# Patient Record
Sex: FEMALE | Race: BLACK OR AFRICAN AMERICAN | NOT HISPANIC OR LATINO | Employment: PART TIME | URBAN - METROPOLITAN AREA
[De-identification: names, ages, dates, MRNs, and addresses within clinical notes are randomized per-mention and may not be internally consistent; named-entity substitution may affect disease eponyms.]

---
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
## 2022-02-17 ENCOUNTER — HISTORICAL (OUTPATIENT)
Dept: ADMINISTRATIVE | Facility: HOSPITAL | Age: 22
End: 2022-02-17

## 2022-04-27 ENCOUNTER — HISTORICAL (OUTPATIENT)
Dept: ADMINISTRATIVE | Facility: HOSPITAL | Age: 22
End: 2022-04-27
Payer: MEDICAID

## 2022-04-30 NOTE — ED PROVIDER NOTES
Patient:   Saloni Newell             MRN: [id number]            FIN: [id number]               Age:   21 years     Sex:  Female     :  2000   Associated Diagnoses:   Acute tonsillitis   Author:   Arron Jones      Basic Information   Time seen: Date & time 3/26/2021 20:18:00.   History source: Patient.   Arrival mode: Private vehicle.   Additional information: Patient's physician(s): [id number]: Assumed care CARLOS ALANIS, Chief Complaint from Nursing Triage Note : Chief Complaint   3/26/2021 20:05 CDT      Chief Complaint           Seen in ED 3/20, Dx w/ strep. Today, C/O throat pain, fever, weakness, vomiting, and tachycardia.  .      History of Present Illness   The patient presents with   21 year old female presents to ED for palpitations, throat pain, vomiting; Patient was seen in ED on Saturday for similar symptoms - BERTRAND Lucia .  The onset was 1  weeks ago.  The course/duration of symptoms is fluctuating in intensity.  Character of symptoms fast.  The degree at onset was moderate.  The degree at present is moderate.  The exacerbating factor is none.  The relieving factor is none.  Risk factors consist of none.  Prior episodes: none.  Therapy today: see nurses notes.  Associated symptoms: nausea, vomiting and fever, sore throat .        Review of Systems   Constitutional symptoms:  Fever, weakness, fatigue, No chills,    ENMT symptoms:  Sore throat.   Respiratory symptoms:  No shortness of breath, no cough.    Cardiovascular symptoms:  Palpitations, No chest pain,    Gastrointestinal symptoms:  Nausea, vomiting, No diarrhea,    Genitourinary symptoms:  No dysuria,    Musculoskeletal symptoms:  No back pain,    Neurologic symptoms:  No altered level of consciousness, no weakness.    Endocrine symptoms:  No polyuria, no polydipsia, no polyphagia.              Additional review of systems information: All other systems reviewed and otherwise negative.      Health Status    Allergies:    Allergic Reactions (Selected)  No Known Allergies,    Allergies (1) Active Reaction  No Known Allergies None Documented  .   Medications:  (Selected)   Documented Medications  Documented  LORATADINE   TAB 10MG: 10 mg = 1 tab(s), Daily  SMZ/TMP DS   -160: 1 tab(s), BID, per nurse's notes.   Immunizations: Per nurse's notes.   Menstrual history: Per nurse's notes.      Past Medical/ Family/ Social History   Medical history: Negative.    Medical history:    No active or resolved past medical history items have been selected or recorded., Reviewed as documented in chart.        Medical history:    No active or resolved past medical history items have been selected or recorded..       Surgical history: Negative.    Surgical history:    No active procedure history items have been selected or recorded., Reviewed as documented in chart.        Surgical history:    No active procedure history items have been selected or recorded..       Family history: Not significant.    Family history:    No family history items have been selected or recorded., Reviewed as documented in chart.        Family history:    No family history items have been selected or recorded..       Social history:    Social & Psychosocial Habits    Tobacco  03/20/2021  Use: Never (less than 100 in l    Patient Wants Consult For Cessation Counseling No    03/26/2021  Use: Never (less than 100 in l    Patient Wants Consult For Cessation Counseling N/A    Abuse/Neglect  03/20/2021  SHX Any signs of abuse or neglect No    03/26/2021  SHX Any signs of abuse or neglect No  , Reviewed as documented in chart, Alcohol use: Denies, Tobacco use: Denies, Drug use: Denies.   Problem list:    Active Problems (1)  No Chronic Problems   , per nurse's notes.      Physical Examination               Vital Signs   Vital Signs   3/26/2021 20:05 CDT      Temperature Oral          38.0 DegC                             Temperature Oral (calculated)              100.40 DegF                             Peripheral Pulse Rate     122 bpm  HI                             Respiratory Rate          16 br/min                             SpO2                      96 %                             Oxygen Therapy            Room air                             Systolic Blood Pressure   119 mmHg                             Diastolic Blood Pressure  83 mmHg  .      Vital Signs (last 24 hrs)_____  Last Charted___________  Temp Oral     38.0 DegC  (MAR 26 20:05)  Heart Rate Peripheral   H 122bpm  (MAR 26 20:05)  Resp Rate         16 br/min  (MAR 26 20:05)  SBP      119 mmHg  (MAR 26 20:05)  DBP      83 mmHg  (MAR 26 20:05)  SpO2      96 %  (MAR 26 20:05)  .   Measurements   3/26/2021 20:05 CDT      Weight Dosing             133 kg                             Weight Measured and Calculated in Lbs     293.21 lb                             Weight Estimated          133 kg                             Height/Length Dosing      175.26 cm                             Height/Length Estimated   175.26 cm                             Body Mass Index Estimated 43.3 kg/m2  .   Basic Oxygen Information   3/26/2021 20:05 CDT      SpO2                      96 %                             Oxygen Therapy            Room air  .   General:  Alert, mild distress, well hydrated no acute distress , Skin: Normal for ethnicity.    Skin:  Warm, dry, pink, intact.    Head:  Normocephalic.   Neck:  Supple, no tenderness, full range of motion.    Eye:  Pupils are equal, round and reactive to light, extraocular movements are intact, normal conjunctiva.    Ears, nose, mouth and throat:  Oral mucosa moist, Throat: Bilateral, moderate, tonsil, erythema, with exudate, swelling.    Cardiovascular:  Regular rate and rhythm, No murmur, Normal peripheral perfusion.    Respiratory:  Lungs are clear to auscultation, breath sounds are equal, Respirations: not tachypneic, not labored, not shallow, Retractions: None.    Chest  wall:  No tenderness.   Back:  Normal range of motion, Normal alignment, No costovertebral angle tenderness,    Musculoskeletal:  Normal ROM, normal strength, no swelling, no deformity.    Gastrointestinal:  Soft, Nontender, Non distended, Normal bowel sounds.    Neurological:  Alert and oriented to person, place, time, and situation, No focal neurological deficit observed, normal sensory observed, normal motor observed, normal speech observed, normal coordination observed, Gait: Normal.    Psychiatric:  Cooperative, appropriate mood & affect, normal judgment.       Medical Decision Making   Documents reviewed:  Emergency department nurses' notes.   Orders  Launch Orders   Laboratory:  POC iSTAT ER Troponin request (Order): Blood, Stat collect, 3/26/2021 20:18 CDT by Lance Cadena PA-C, Lab Collect, Print Label By Order Location  Troponin-I (Order): Stat collect, 3/26/2021 20:18 CDT, Blood, Lab Collect, Print Label By Order Location, 3/26/2021 20:18 CDT  Free T4 (Order): Stat collect, 3/26/2021 20:18 CDT, Blood, Lab Collect, Print Label By Order Location, 3/26/2021 20:18 CDT  TSH (Order): Stat collect, 3/26/2021 20:18 CDT, Blood, Lab Collect, Print Label By Order Location, 3/26/2021 20:18 CDT  Urinalysis with Reflex (Order): Stat collect, Urine, 3/26/2021 20:18 CDT, Nurse collect, Print Label By Order Location  POC Urine Pregnancy Test ED (Order): Urine, Stat collect, Collected, 3/26/2021 20:18 CDT by Lance Cadena PA-C Nurse collect, Print Label By Order Location  CBC w/ Auto Diff (Order): Stat collect, 3/26/2021 20:18 CDT, Blood, Lab Collect, Print Label By Order Location, 3/26/2021 20:18 CDT  CMP (Order): Stat collect, 3/26/2021 20:18 CDT, Blood, Lab Collect, Print Label By Order Location, 3/26/2021 20:18 CDT  Radiology:  XR Chest 1 View (Order): Stat, 3/26/2021 20:18 CDT, Cough, None, Stretcher, Rad Type, Not Scheduled, Launch Orders   Pharmacy:  Normal Saline (0.9% NS) IV 1,000 mL (Order): 1,000 mL, 1,000  mL, IV, Once, 1,000 mL/hr, start date 3/26/2021 20:24 CDT, 2.43, m2, Launch Orders   Laboratory:  Mononucleosis Screen (Order): Stat collect, 3/26/2021 23:50 CDT, Blood, Lab Collect, Print Label By Order Location, 3/26/2021 23:50 CDT  ED POC StrepA PCR Request: (Order): Throat, Routine collect, 3/26/2021 23:50 CDT by Earnestine SMALL, Arron Medley, Nurse collect  FLU A&B/COVID PCR Combo (Order): Stat collect, Nasopharyngeal Swab, 3/26/2021 23:49 CDT, Nurse collect  , Launch Orders   Pharmacy:  Lidocaine Viscous 2% mucous membrane solution (Order): 5 mL, form: Soln, TOP, Once, first dose 3/27/2021 0:03 CDT, stop date 3/27/2021 0:03 CDT, gargle and spit  .    Electrocardiogram:  Time 3/26/2021 20:11:00, rate 118, No ST-T changes, no ectopy, normal AR & QRS intervals, EP Interp, The Rhythm is sinus tachycardia.  .    Results review:  Lab results : Lab View   3/26/2021 23:52 CDT      Strep A PCR               NOT DETECTED    3/26/2021 23:50 CDT      Influ A PCR               Negative                             Influ B PCR               Negative                             SARS-CoV-2 PCR            Not Detected    3/26/2021 21:29 CDT      Est Creat Clearance Ser   86.76 mL/min    3/26/2021 20:50 CDT      POC Troponin              0.00 ng/mL    3/26/2021 20:45 CDT      Sodium Lvl                135 mmol/L  LOW                             Potassium Lvl             3.9 mmol/L                             Chloride                  98 mmol/L                             CO2                       23 mmol/L                             Calcium Lvl               10.0 mg/dL                             Glucose Lvl               107 mg/dL  HI                             BUN                       9.7 mg/dL                             Creatinine                1.09 mg/dL  HI                             eGFR-AA                   >60  NA                             eGFR-FARRUKH                  >60 mL/min/1.73 m2  NA                              Bili Total                0.5 mg/dL                             Bili Direct               0.2 mg/dL                             Bili Indirect             0.30 mg/dL                             AST                       29 unit/L                             ALT                       31 unit/L                             Alk Phos                  100 unit/L                             Total Protein             8.9 gm/dL  HI                             Albumin Lvl               3.7 gm/dL                             Globulin                  5.2 gm/dL  HI                             A/G Ratio                 0.7 ratio  LOW                             Troponin-I                <0.010 ng/mL                             T4 Free                   0.94 ng/dL                             TSH                       4.9599 uIU/mL  HI                             WBC                       17.8 x10(3)/mcL  HI                             RBC                       4.83 x10(6)/mcL                             Hgb                       13.1 gm/dL                             Hct                       38.7 %                             Platelet                  409 x10(3)/mcL  HI                             MCV                       80.1 fL                             MCH                       27.1 pg                             MCHC                      33.9 gm/dL                             RDW                       13.7 %                             MPV                       10.4 fL                             Abs Neut                  12.39 x10(3)/mcL  HI                             Neutro Auto               70 %  NA                             Lymph Auto                16 %  NA                             Mono Auto                 13 %  NA                             Eos Auto                  0 %  NA                             Abs Eos                   0.1 x10(3)/mcL                             Basophil Auto             0 %  NA                              Abs Neutro                12.39 x10(3)/mcL  HI                             Abs Lymph                 2.8 x10(3)/mcL                             Abs Mono                  2.3 x10(3)/mcL  HI                             Abs Baso                  0.1 x10(3)/mcL    ,    No qualifying data available.    Chest X-Ray:  Time reported 3/26/2021 23:45:00, no acute disease process, interpretation by Emergency Physician.       Reexamination/ Reevaluation   Vital signs   Basic Oxygen Information   3/26/2021 20:05 CDT      SpO2                      96 %                             Oxygen Therapy            Room air        Impression and Plan   Diagnosis   Acute tonsillitis ([id number] J03.90)      Calls-Consults   -  Dr Robles will speak to ENT for admission .   Plan   Condition: Improved, Stable.    Disposition: Admit time  3/27/2021 00:59:00, Admit to Inpatient Unit.    Counseled: Patient, Regarding diagnosis, Regarding diagnostic results, Regarding treatment plan, Patient indicated understanding of instructions.    Notes: Admitted in collaboration with Dr. Judd .

## 2022-04-30 NOTE — ED PROVIDER NOTES
Patient:   Saloni Newell             MRN: [id number]            FIN: [id number]               Age:   21 years     Sex:  Female     :  2000   Associated Diagnoses:   None   Author:   Binta SINGLETON MD, Marty VERDE      Addendum      Teaching-Supervisory Addendum-Brief   I participated in the following activities of this patients care: the medical history, the physical exam, medical decision making.   I personally performed: the medical history, the physical exam, the medical decision making.   The case was discussed with: the nurse practitioner.   Evaluation and management service: I agree with the evaluation and management decisions made in this patient's care.   Time seen: 3/27/2021 .   Results interpretation: I agree with the study interpretation in this patient's care, Nurse's notes reviewed.   Vital signs: Basic Oxygen Information   3/27/2021 0:18 CDT       SpO2                      97 %    3/26/2021 20:05 CDT      SpO2                      96 %                             Oxygen Therapy            Room air  .   Notes: Patient seen and evaluated by the nurse practitioner I've also seen and evaluated and perform a own independent history and physical, this is a 21-year-old female with no significant medical history who is had a one-week history of sore throat she presented to this emergency room on the  a CT of the soft tissue neck showed enlarged tonsils with adenopathy no abscess but cannot rule out microabscess formation.  Patient was given Decadron patient has been to the ER and primary care doctor for times a week is gotten steroids prednisone Norco antibiotics without any significant relief patient unable swallow soup on exam it is a pleasant overweight female 3+ exudative tonsils.  Erythematous no uvular midline shift posterior oropharynx appears open patient with a mild leukocytosis mild tachycardia febrile discussed case with Dr. Dany Valdivia will bring in for evaluation.

## 2022-05-04 NOTE — HISTORICAL [facility] CERNER
This is a historical note converted from Rony. Formatting and pictures may have been removed.  Please reference Rony for original formatting and attached multimedia. Admit and Discharge Dates  Admit Date: 03/26/2021  Discharge Date: 03/28/2021  Physicians  Attending Physician - Dany LO, Franki MADDEN  Admitting Physician - Dany LO, Franki MADDEN  Primary Care Physician - Colt LO , Henrique  Discharge Diagnosis  Acute tonsillitis?J03.90  Palpitations?[id number]  Surgical Procedures  No procedures recorded for this visit.  Immunizations  No immunizations recorded for this visit.  Admission Information  21 year old female with acute tonsillitis with failed outpatient treatment and dehydration. Admitted for IV antibiotics, steroids and IV fluids.  Significant Findings  She had tremendous improvement after 36 hours of treatment. PO?intake was significantly improved.?Discharged home on PO antibiotics.  Time Spent on discharge  20 minutes  Objective  Physical Exam  NAD  3+ tonsils  Shotty LAD  Unlabored breathing  Patient Discharge Condition  Good  Discharge Disposition  Home   Discharge Medication Reconciliation  Prescribed  cefdinir (cefdinir 300 mg oral capsule)?300 mg, Oral, q12hr  Continue  loratadine (LORATADINE ? TAB 10MG)?10 mg, Daily  Discontinue  sulfamethoxazole-trimethoprim (SMZ/TMP DS ? -160)?1 tab(s), BID  Education and Orders Provided  ENT--Tonsillectomy, Adult (CUSTOM)  Tonsillitis  Antibiotic Medicine, Adult  Discharge - 03/28/21 8:12:00 CDT, Home, Give all scheduled vaccinations prior to discharge.?  Discharge Activity - Activity as Tolerated?  Discharge Diet - Regular?  Follow up  More Gregory MD  ????PCP in 1-2 weeksDue to your afterhours discharge, we were unable to schedule your appointment with your physician. Someone from [address] [place] [address] will call you on the next business day with your appointment information.  [employer]  No Qualifying Data

## 2022-05-04 NOTE — HISTORICAL [facility] CERNER
This is a historical note converted from [employer]. Formatting and pictures may have been removed.  Please reference [employer] for original formatting and attached multimedia. Chief Complaint  Seen in ED 3/20, Dx w/ strep. Today, C/O throat pain, fever, weakness, vomiting, and tachycardia.  History of Present Illness  21 year old female who presents today with sore throat and poor PO intake. Symptoms began over a week ago. She presented to the ER one week ago with similar symptoms. She was treated with Decadron and discharged with Norco and oral antibiotics. Symptoms have worsened and she is unable to tolerate even liquids. Complains of lightheadedness and heart palpitations. + fever.  Review of Systems  Constitutional - Denies other than HPI  Eye - Denies  HENT - Denies other than HPI  Respiratory - Denies  Cardiovascular - Denies  Gastrointestinal - Denies  Genitourinary - Denies  Heme/Lymph ?- Denies  Endocrine ?- Denies  Immunologic ?- Denies  Musculoskeletal ?- Denies  Integumentary ?- Denies  Neurologic ?- Denies  All Other ROS negative  Physical Exam  Vitals & Measurements  T:?36.8? ?C (Oral)? TMIN:?36.8? ?C (Oral)? TMAX:?38.0? ?C (Oral)? HR:?79(Peripheral)? RR:?16? BP:?100/66? SpO2:?97%? WT:?133?kg? BMI:?43.43?  General - mild distress, alert/oriented, hot potato voice  Eye - extraocular movements intact bilaterally, pupils equal round and reactive to light and accommodation  Head - normocephalic, atraumatic  Ears - externally normal. Pneumatized bilaterally.  Nose - bilateral nares patent, nasal septal midline, no masses/lesions apparent  Oral cavity/oropharynx - mucosal membranes moist, 4+ exudative tonsils, floor of mouth soft and nontender, tongue within normal limits, no masses or lesions noted  Neck - supple, no cervical lymphadenopathy, normal range of motion  Respiratory - nonlabored breathing  Neurologic - cranial nerves II to XII grossly intact bilaterally  Assessment/Plan  21 year old female with acute  tonsillitis, failed outpatient treatment, dehydration  ?  Admit for observation  Start IV Rocephin and Decadron  IV fluids   Problem List/Past Medical History  Ongoing  Morbid obesity  Historical  No qualifying data  Medications  Inpatient  acetaminophen, 1000 mg= 2 tab(s), Oral, q6hr, PRN  Decadron, 4 mg= 1 mL, IV Push, BID  Magic Mouthwash, 5 mL, Oral, QID, PRN  morphine 4 mg/mL preservative-free intravenous solution, 4 mg= 1 mL, IV Push, q4hr, PRN  Rocephin (for IVPB)  Sodium Chloride 0.9% intravenous solution 1,000 mL, 1000 mL, IV  Toradol, 30 mg= 1 mL, IV Push, q6hr, PRN  Zofran, 4 mg= 2 mL, IV Push, q4hr, PRN  Home  LORATADINE TAB 10MG, 10 mg= 1 tab(s), Daily  SMZ/TMP DS -160, 1 tab(s), BID  Allergies  No Known Allergies  Social History  Abuse/Neglect  No, 03/26/2021  No, 03/20/2021  Tobacco  Never (less than 100 in lifetime), N/A, 03/26/2021  Never (less than 100 in lifetime), No, 03/20/2021  Immunizations  Vaccine Date Status   tetanus toxoid 09/10/2013 Given   Diagnostic Results  (03/20/2021 11:53 CDT CT Soft Tissue Neck W Contrast)  Radiology Report  CT Soft Tissue Neck W Contrast  ?  REASON FOR STUDY: Throat infection, difficulty swallowing  ?  TECHNIQUE: CT imaging was performed of the neck with IV contrast.  Automatic exposure control, adjustment of mA/kV or iterative  reconstruction technique was used to limit radiation dose.  ?  COMPARISON: No relevant comparison studies available at the time of  dictation.?  ?  FINDINGS:?  ?  There is increased soft tissue prominence of Waldeyers ring  consistent with lymphadenopathy. The bilateral lingual tonsils are  enlarged and heterogeneous without a well-defined or measurable fluid  collection. The airway is patent. The parapharyngeal soft tissues are  otherwise unremarkable.  ?  The bilateral parotid and submandibular glands are unremarkable. There  are scattered prominent lymph nodes throughout the bilateral cervical  chains. These may be reactive. The  largest is located on the left deep  to the sternocleidomastoid muscle measuring 9 mm on short axis (series  3, image #29). The soft tissues of the neck are otherwise  unremarkable.  ?  The bilateral orbits are unremarkable. The visualized intracranial  structures are unremarkable. The thyroid gland is unremarkable. The  visualized upper mediastinum is unremarkable. The visualized lung  apices are unremarkable. The osseous structures are unremarkable.  ?  ?  IMPRESSION:  ?  1. Lymphadenopathy involving Waldeyers ring with heterogeneity of the  bilateral lingual tonsils. There is no well-defined or focal fluid  collection to be measured to suggest drainable abscess. The  heterogeneity is nonspecific with microabscess formation a  possibility.  ?  2. Mildly prominent cervical chain lymph nodes are most likely  reactive. [1]     [1]?CT Soft Tissue Neck W Contrast; Meera LO, Ben ARMENDARIZ 03/20/2021 11:53 CDT

## 2024-02-29 ENCOUNTER — HOSPITAL ENCOUNTER (EMERGENCY)
Facility: HOSPITAL | Age: 24
Discharge: HOME | End: 2024-02-29
Attending: EMERGENCY MEDICINE
Payer: COMMERCIAL

## 2024-02-29 VITALS
HEIGHT: 69 IN | WEIGHT: 293 LBS | OXYGEN SATURATION: 100 % | DIASTOLIC BLOOD PRESSURE: 85 MMHG | SYSTOLIC BLOOD PRESSURE: 130 MMHG | TEMPERATURE: 98 F | BODY MASS INDEX: 43.4 KG/M2 | RESPIRATION RATE: 18 BRPM | HEART RATE: 72 BPM

## 2024-02-29 DIAGNOSIS — R07.89 CHEST WALL PAIN: Primary | ICD-10-CM

## 2024-02-29 DIAGNOSIS — R07.9 CHEST PAIN: ICD-10-CM

## 2024-02-29 PROCEDURE — 99283 EMERGENCY DEPT VISIT LOW MDM: CPT | Mod: 25

## 2024-02-29 RX ORDER — DICLOFENAC SODIUM 50 MG/1
50 TABLET, DELAYED RELEASE ORAL 3 TIMES DAILY PRN
Qty: 15 TABLET | Refills: 0 | Status: SHIPPED | OUTPATIENT
Start: 2024-02-29 | End: 2024-03-05

## 2024-03-01 NOTE — FIRST PROVIDER EVALUATION
"Medical screening examination initiated.  I have conducted a focused provider triage encounter, findings are as follows:    Brief history of present illness:  arrived to ED due to intermittent CP for several months. Reports started on L side and now on R side. Also c/o SOB. States reading helps with symptoms.     Vitals:    02/29/24 1947   BP: 134/84   Pulse: 79   Resp: 20   Temp: 97.5 °F (36.4 °C)   SpO2: 99%   Weight: (!) 140.2 kg (309 lb)   Height: 5' 9" (1.753 m)       Pertinent physical exam:  awake, alert, has non-labored breathing, and follows commands.     Brief workup plan:  imaging     Preliminary workup initiated; this workup will be continued and followed by the physician or advanced practice provider that is assigned to the patient when roomed.  "

## 2024-03-01 NOTE — ED PROVIDER NOTES
Encounter Date: 2/29/2024       History     Chief Complaint   Patient presents with    Chest Pain     Intermittent CP on left side radiating to right side x 5 months, woke up with CP today , pain has been constant with sob. Pt denies fever, n/v/cough, congestion. Reading helps relieve the pain     See MDM    The history is provided by the patient. No  was used.     Review of patient's allergies indicates:  No Known Allergies  Past Medical History:   Diagnosis Date    Known health problems: none      Past Surgical History:   Procedure Laterality Date    none       History reviewed. No pertinent family history.  Social History     Tobacco Use    Smoking status: Never    Smokeless tobacco: Never   Substance Use Topics    Alcohol use: Not Currently    Drug use: Never     Review of Systems   Constitutional:  Negative for fever.   Respiratory:  Negative for cough and shortness of breath.    Cardiovascular:  Positive for chest pain.   Gastrointestinal:  Negative for abdominal pain.   Genitourinary:  Negative for difficulty urinating and dysuria.   Musculoskeletal:  Negative for gait problem.   Skin:  Negative for color change.   Neurological:  Negative for dizziness, speech difficulty and headaches.   Psychiatric/Behavioral:  Negative for hallucinations and suicidal ideas.    All other systems reviewed and are negative.      Physical Exam     Initial Vitals [02/29/24 1947]   BP Pulse Resp Temp SpO2   134/84 79 20 97.5 °F (36.4 °C) 99 %      MAP       --         Physical Exam    Nursing note and vitals reviewed.  Constitutional: She appears well-developed and well-nourished.   HENT:   Head: Normocephalic.   Eyes: EOM are normal.   Neck:   Normal range of motion.  Cardiovascular:  Normal rate, regular rhythm, normal heart sounds and intact distal pulses.           Pulmonary/Chest: Breath sounds normal. No respiratory distress. She exhibits tenderness.   Abdominal: Abdomen is soft. Bowel sounds are normal.  There is no abdominal tenderness.   Musculoskeletal:         General: Normal range of motion.      Cervical back: Normal range of motion.     Neurological: She is alert and oriented to person, place, and time. She has normal strength.   Skin: Skin is warm and dry.   Psychiatric: She has a normal mood and affect. Her behavior is normal. Judgment and thought content normal.         ED Course   Procedures  Labs Reviewed - No data to display       Imaging Results              X-Ray Chest PA And Lateral (In process)                      Medications - No data to display  Medical Decision Making  Historian:  Patient.  Patient is a 24-year-old female  that presents with chest pain intermittent that has been present 5 months. Associated symptoms nothing. Surrounding information is nothing. Exacerbated by nothing. Relieved by nothing. Patient treatment prior to arrival nothing. Risk factors include none. Other history pertaining to this complaint none.   Assessment:  See physical exam.  DD:  Chest pain, chest wall pain, costochondritis, anemia.  Reviewed previous labs and no anemia noted.  ED Course: History was obtained.  Physical was performed.  Patient had tenderness to the bilateral mid chest.  This appears to be costochondritis.  I will put her on diclofenac. Medical or surgical consults:  None. Social determinants that affect healthcare:  None.       Amount and/or Complexity of Data Reviewed  Radiology:      Details: Chest x-ray no acute findings    Risk  Prescription drug management.  Risk Details: Diclofenac.  Risks and benefits discussed                                      Clinical Impression:  Final diagnoses:  [R07.9] Chest pain  [R07.89] Chest wall pain (Primary)          ED Disposition Condition    Discharge Stable          ED Prescriptions       Medication Sig Dispense Start Date End Date Auth. Provider    diclofenac (VOLTAREN) 50 MG EC tablet Take 1 tablet (50 mg total) by mouth 3 (three) times daily as needed  (pain). 15 tablet 2/29/2024 3/5/2024 Arron Colby FNP          Follow-up Information       Follow up With Specialties Details Why Contact Info    Your Primary Care Provider  Call in 3 days ed follow up              Arron Colby FNP  02/29/24 2127

## 2024-09-01 ENCOUNTER — HOSPITAL ENCOUNTER (EMERGENCY)
Facility: HOSPITAL | Age: 24
Discharge: HOME | End: 2024-09-01
Attending: EMERGENCY MEDICINE
Payer: COMMERCIAL

## 2024-09-01 VITALS
SYSTOLIC BLOOD PRESSURE: 128 MMHG | TEMPERATURE: 98 F | HEIGHT: 69 IN | WEIGHT: 293 LBS | DIASTOLIC BLOOD PRESSURE: 93 MMHG | RESPIRATION RATE: 23 BRPM | HEART RATE: 86 BPM | BODY MASS INDEX: 43.4 KG/M2 | OXYGEN SATURATION: 98 %

## 2024-09-01 DIAGNOSIS — R52 GENERALIZED BODY ACHES: Primary | ICD-10-CM

## 2024-09-01 DIAGNOSIS — R07.9 CHEST PAIN: ICD-10-CM

## 2024-09-01 DIAGNOSIS — Z83.2 FAMILY HISTORY OF AUTOIMMUNE DISORDER: ICD-10-CM

## 2024-09-01 LAB
ALBUMIN SERPL-MCNC: 3.9 G/DL (ref 3.5–5)
ALBUMIN/GLOB SERPL: 1.2 RATIO (ref 1.1–2)
ALP SERPL-CCNC: 90 UNIT/L (ref 40–150)
ALT SERPL-CCNC: 12 UNIT/L (ref 0–55)
ANION GAP SERPL CALC-SCNC: 9 MEQ/L
AST SERPL-CCNC: 15 UNIT/L (ref 5–34)
B-HCG UR QL: NEGATIVE
BACTERIA #/AREA URNS AUTO: ABNORMAL /HPF
BASOPHILS # BLD AUTO: 0.07 X10(3)/MCL
BASOPHILS NFR BLD AUTO: 0.7 %
BILIRUB SERPL-MCNC: 0.4 MG/DL
BILIRUB UR QL STRIP.AUTO: NEGATIVE
BUN SERPL-MCNC: 12.6 MG/DL (ref 7–18.7)
CALCIUM SERPL-MCNC: 9.3 MG/DL (ref 8.4–10.2)
CHLORIDE SERPL-SCNC: 108 MMOL/L (ref 98–107)
CLARITY UR: CLEAR
CO2 SERPL-SCNC: 23 MMOL/L (ref 22–29)
COLOR UR AUTO: ABNORMAL
CREAT SERPL-MCNC: 0.93 MG/DL (ref 0.55–1.02)
CREAT/UREA NIT SERPL: 14
EOSINOPHIL # BLD AUTO: 0.16 X10(3)/MCL (ref 0–0.9)
EOSINOPHIL NFR BLD AUTO: 1.5 %
ERYTHROCYTE [DISTWIDTH] IN BLOOD BY AUTOMATED COUNT: 14.1 % (ref 11.5–17)
FLUAV AG UPPER RESP QL IA.RAPID: NOT DETECTED
FLUBV AG UPPER RESP QL IA.RAPID: NOT DETECTED
GFR SERPLBLD CREATININE-BSD FMLA CKD-EPI: >60 ML/MIN/1.73/M2
GLOBULIN SER-MCNC: 3.3 GM/DL (ref 2.4–3.5)
GLUCOSE SERPL-MCNC: 87 MG/DL (ref 74–100)
GLUCOSE UR QL STRIP: NORMAL
HCT VFR BLD AUTO: 39.2 % (ref 37–47)
HGB BLD-MCNC: 13 G/DL (ref 12–16)
HGB UR QL STRIP: ABNORMAL
IMM GRANULOCYTES # BLD AUTO: 0.04 X10(3)/MCL (ref 0–0.04)
IMM GRANULOCYTES NFR BLD AUTO: 0.4 %
KETONES UR QL STRIP: NEGATIVE
LEUKOCYTE ESTERASE UR QL STRIP: NEGATIVE
LYMPHOCYTES # BLD AUTO: 3.22 X10(3)/MCL (ref 0.6–4.6)
LYMPHOCYTES NFR BLD AUTO: 31.1 %
MCH RBC QN AUTO: 27.3 PG (ref 27–31)
MCHC RBC AUTO-ENTMCNC: 33.2 G/DL (ref 33–36)
MCV RBC AUTO: 82.2 FL (ref 80–94)
MONOCYTES # BLD AUTO: 0.58 X10(3)/MCL (ref 0.1–1.3)
MONOCYTES NFR BLD AUTO: 5.6 %
MUCOUS THREADS URNS QL MICRO: ABNORMAL /LPF
NEUTROPHILS # BLD AUTO: 6.28 X10(3)/MCL (ref 2.1–9.2)
NEUTROPHILS NFR BLD AUTO: 60.7 %
NITRITE UR QL STRIP: NEGATIVE
NRBC BLD AUTO-RTO: 0 %
PH UR STRIP: 5.5 [PH]
PLATELET # BLD AUTO: 367 X10(3)/MCL (ref 130–400)
PMV BLD AUTO: 10.4 FL (ref 7.4–10.4)
POTASSIUM SERPL-SCNC: 4.3 MMOL/L (ref 3.5–5.1)
PROT SERPL-MCNC: 7.2 GM/DL (ref 6.4–8.3)
PROT UR QL STRIP: ABNORMAL
RBC # BLD AUTO: 4.77 X10(6)/MCL (ref 4.2–5.4)
RBC #/AREA URNS AUTO: ABNORMAL /HPF
SARS-COV-2 RNA RESP QL NAA+PROBE: NOT DETECTED
SODIUM SERPL-SCNC: 140 MMOL/L (ref 136–145)
SP GR UR STRIP.AUTO: 1.03 (ref 1–1.03)
SQUAMOUS #/AREA URNS LPF: ABNORMAL /HPF
UROBILINOGEN UR STRIP-ACNC: NORMAL
WBC # BLD AUTO: 10.35 X10(3)/MCL (ref 4.5–11.5)
WBC #/AREA URNS AUTO: ABNORMAL /HPF

## 2024-09-01 PROCEDURE — 80053 COMPREHEN METABOLIC PANEL: CPT | Performed by: NURSE PRACTITIONER

## 2024-09-01 PROCEDURE — 99284 EMERGENCY DEPT VISIT MOD MDM: CPT | Mod: 25

## 2024-09-01 PROCEDURE — 25000003 PHARM REV CODE 250: Performed by: PHYSICIAN ASSISTANT

## 2024-09-01 PROCEDURE — 81001 URINALYSIS AUTO W/SCOPE: CPT | Performed by: NURSE PRACTITIONER

## 2024-09-01 PROCEDURE — 0240U COVID/FLU A&B PCR: CPT | Performed by: PHYSICIAN ASSISTANT

## 2024-09-01 PROCEDURE — 81025 URINE PREGNANCY TEST: CPT | Performed by: NURSE PRACTITIONER

## 2024-09-01 PROCEDURE — 85025 COMPLETE CBC W/AUTO DIFF WBC: CPT | Performed by: NURSE PRACTITIONER

## 2024-09-01 RX ORDER — IBUPROFEN 800 MG/1
800 TABLET ORAL 3 TIMES DAILY
Qty: 30 TABLET | Refills: 0 | Status: SHIPPED | OUTPATIENT
Start: 2024-09-01

## 2024-09-01 RX ADMIN — IBUPROFEN 800 MG: 200 TABLET, FILM COATED ORAL at 07:09

## 2024-09-01 NOTE — FIRST PROVIDER EVALUATION
Medical screening examination initiated.  I have conducted a focused provider triage encounter, findings are as follows:    Brief history of present illness:  25y/o F presents to the ED with chest pain with numbness to bilateral hand and feet. Onset 1 week. States having weakness.     There were no vitals filed for this visit.    Pertinent physical exam:  AAA x 3    Brief workup plan:  Labs    Preliminary workup initiated; this workup will be continued and followed by the physician or advanced practice provider that is assigned to the patient when roomed.

## 2024-09-01 NOTE — ED PROVIDER NOTES
Encounter Date: 9/1/2024       History     Chief Complaint   Patient presents with    Chest Pain     Pt to ED via POV. Pt states that she has had midsternal CP since 8/26. Pt also states that she is having numbness in her hands and toes X 1 month. Pt denies SOB.      24-year-old  female with no past medical history presents to the emergency room with body aches and headaches for the last 7 days.  No URI symptoms.  Denies nausea, vomiting, fever, chills, chest pain, or difficulty breathing.  Patient states she has these flare-ups every few months but says this is the worst 1.  Denies any known sick contacts.  Mother present at bedside is concerned about possible lupus because she has a family history of it.no known injury    The history is provided by the patient and a parent. No  was used.     Review of patient's allergies indicates:  No Known Allergies  Past Medical History:   Diagnosis Date    Known health problems: none      Past Surgical History:   Procedure Laterality Date    none       No family history on file.  Social History     Tobacco Use    Smoking status: Never    Smokeless tobacco: Never   Substance Use Topics    Alcohol use: Not Currently    Drug use: Never     Review of Systems   Constitutional: Negative.    HENT: Negative.     Eyes: Negative.    Respiratory: Negative.     Cardiovascular: Negative.    Gastrointestinal: Negative.    Genitourinary: Negative.    Musculoskeletal:  Positive for arthralgias, back pain and myalgias.   Neurological: Negative.        Physical Exam     Initial Vitals [09/01/24 1403]   BP Pulse Resp Temp SpO2   (!) 141/91 86 19 98 °F (36.7 °C) 99 %      MAP       --         Physical Exam    Vitals reviewed.  Constitutional: She appears well-developed and well-nourished. She is Obese . No distress.   HENT:   Head: Normocephalic and atraumatic.   Eyes: Conjunctivae, EOM and lids are normal. Pupils are equal, round, and reactive to light.    Neck: Neck supple.   Normal range of motion.  Cardiovascular:  Normal rate and regular rhythm.           Pulmonary/Chest: Effort normal and breath sounds normal.   Abdominal: Abdomen is soft and flat. Bowel sounds are normal.   Musculoskeletal:      Cervical back: Normal range of motion and neck supple.     Neurological: She is alert and oriented to person, place, and time. She has normal strength. She is not disoriented. No cranial nerve deficit or sensory deficit. GCS eye subscore is 4. GCS verbal subscore is 5. GCS motor subscore is 6.   Skin: Skin is warm and intact.   Psychiatric: She has a normal mood and affect. Her speech is normal and behavior is normal. Judgment and thought content normal. Cognition and memory are normal.         ED Course   Procedures  Labs Reviewed   COMPREHENSIVE METABOLIC PANEL - Abnormal       Result Value    Sodium 140      Potassium 4.3      Chloride 108 (*)     CO2 23      Glucose 87      Blood Urea Nitrogen 12.6      Creatinine 0.93      Calcium 9.3      Protein Total 7.2      Albumin 3.9      Globulin 3.3      Albumin/Globulin Ratio 1.2      Bilirubin Total 0.4      ALP 90      ALT 12      AST 15      eGFR >60      Anion Gap 9.0      BUN/Creatinine Ratio 14     URINALYSIS, REFLEX TO URINE CULTURE - Abnormal    Color, UA Light-Yellow      Appearance, UA Clear      Specific Gravity, UA 1.027      pH, UA 5.5      Protein, UA Trace (*)     Glucose, UA Normal      Ketones, UA Negative      Blood, UA 3+ (*)     Bilirubin, UA Negative      Urobilinogen, UA Normal      Nitrites, UA Negative      Leukocyte Esterase, UA Negative      RBC, UA 21-50 (*)     WBC, UA 0-5      Bacteria, UA Trace      Squamous Epithelial Cells, UA Trace      Mucous, UA Trace (*)    PREGNANCY TEST, URINE RAPID - Normal    hCG Qualitative, Urine Negative     COVID/FLU A&B PCR - Normal    Influenza A PCR Not Detected      Influenza B PCR Not Detected      SARS-CoV-2 PCR Not Detected      Narrative:     The Xpert  Xpress SARS-CoV-2/FLU/RSV plus is a rapid, multiplexed real-time PCR test intended for the simultaneous qualitative detection and differentiation of SARS-CoV-2, Influenza A, Influenza B, and respiratory syncytial virus (RSV) viral RNA in either nasopharyngeal swab or nasal swab specimens.         CBC W/ AUTO DIFFERENTIAL    Narrative:     The following orders were created for panel order CBC Auto Differential.  Procedure                               Abnormality         Status                     ---------                               -----------         ------                     CBC with Differential[[id number]]                            Final result                 Please view results for these tests on the individual orders.   CBC WITH DIFFERENTIAL    WBC 10.35      RBC 4.77      Hgb 13.0      Hct 39.2      MCV 82.2      MCH 27.3      MCHC 33.2      RDW 14.1      Platelet 367      MPV 10.4      Neut % 60.7      Lymph % 31.1      Mono % 5.6      Eos % 1.5      Basophil % 0.7      Lymph # 3.22      Neut # 6.28      Mono # 0.58      Eos # 0.16      Baso # 0.07      IG# 0.04      IG% 0.4      NRBC% 0.0            Imaging Results              X-Ray Chest PA And Lateral (Final result)  Result time 09/01/24 14:54:51      Final result by Ho Zambrano MD (09/01/24 14:54:51)                   Impression:      No acute cardiopulmonary abnormality.      Electronically signed by: Ho Zambrano  Date:    09/01/2024  Time:    14:54               Narrative:    EXAMINATION:  XR CHEST PA AND LATERAL    CLINICAL HISTORY:  Chest pain, unspecified    COMPARISON:  29 February 2024    FINDINGS:  PA and lateral views of the chest were obtained. Heart and mediastinum within normal limits. The lungs are clear. No pneumothorax or significant effusion.                                       Medications   ibuprofen tablet 800 mg (800 mg Oral Given 9/1/24 1943)     Medical Decision Making  24-year-old  female with no past  medical history presents to the emergency room with body aches and headaches for the last 7 days.  No URI symptoms.  Denies nausea, vomiting, fever, chills, chest pain, or difficulty breathing.  Patient states she has these flare-ups every few months but says this is the worst 1.  Denies any known sick contacts.  Mother present at bedside is concerned about possible lupus because she has a family history of it.no known injury. Has not seen rheumatology specialists.     Problems Addressed:  Generalized body aches: chronic illness or injury with exacerbation, progression, or side effects of treatment     Details: Differential diagnosis included but not limited to:  Covid, flu, viral infection, lupus, underlying rheumatological disease    Mother concerned about underlying rheumatological disease process with family history. Will f/u accordingly.     Amount and/or Complexity of Data Reviewed  Independent Historian: parent  Labs: ordered. Decision-making details documented in ED Course.  Radiology: ordered. Decision-making details documented in ED Course.    Risk  OTC drugs.  Prescription drug management.                                      Clinical Impression:  Final diagnoses:  [R07.9] Chest pain  [R52] Generalized body aches (Primary)  [Z83.2] Family history of autoimmune disorder       This note was typed partially using voice recognition software.  Please be reminded that not all corrections/addendums to grammar may have been made prior to closing of this chart.     ED Disposition Condition    Discharge Stable          ED Prescriptions       Medication Sig Dispense Start Date End Date Auth. Provider    ibuprofen (ADVIL,MOTRIN) 800 MG tablet Take 1 tablet (800 mg total) by mouth 3 (three) times daily. 30 tablet 9/1/2024 -- Ann-Marie Buchanan PA          Follow-up Information    None          Ann-Marie Buchanan PA  09/01/24 2027

## 2024-11-27 ENCOUNTER — PATIENT MESSAGE (OUTPATIENT)
Dept: RESEARCH | Facility: HOSPITAL | Age: 24
End: 2024-11-27
Payer: COMMERCIAL

## 2025-06-01 ENCOUNTER — OFFICE VISIT (OUTPATIENT)
Dept: URGENT CARE | Facility: CLINIC | Age: 25
End: 2025-06-01
Payer: COMMERCIAL

## 2025-06-01 VITALS
HEART RATE: 97 BPM | OXYGEN SATURATION: 99 % | RESPIRATION RATE: 18 BRPM | DIASTOLIC BLOOD PRESSURE: 82 MMHG | SYSTOLIC BLOOD PRESSURE: 116 MMHG | HEIGHT: 69 IN | TEMPERATURE: 99 F | WEIGHT: 293 LBS | BODY MASS INDEX: 43.4 KG/M2

## 2025-06-01 DIAGNOSIS — J02.9 SORE THROAT: Primary | ICD-10-CM

## 2025-06-01 DIAGNOSIS — R05.9 COUGH, UNSPECIFIED TYPE: ICD-10-CM

## 2025-06-01 LAB
CTP QC/QA: YES
CTP QC/QA: YES
MOLECULAR STREP A: NEGATIVE
SARS-COV+SARS-COV-2 AG RESP QL IA.RAPID: NEGATIVE

## 2025-06-01 PROCEDURE — 87651 STREP A DNA AMP PROBE: CPT | Mod: QW,,, | Performed by: NURSE PRACTITIONER

## 2025-06-01 PROCEDURE — 87811 SARS-COV-2 COVID19 W/OPTIC: CPT | Mod: QW,,, | Performed by: NURSE PRACTITIONER

## 2025-06-01 PROCEDURE — 99213 OFFICE O/P EST LOW 20 MIN: CPT | Mod: ,,, | Performed by: NURSE PRACTITIONER

## 2025-06-01 RX ORDER — PROMETHAZINE HYDROCHLORIDE AND DEXTROMETHORPHAN HYDROBROMIDE 6.25; 15 MG/5ML; MG/5ML
5 SYRUP ORAL EVERY 4 HOURS PRN
Qty: 120 ML | Refills: 0 | Status: SHIPPED | OUTPATIENT
Start: 2025-06-01 | End: 2025-06-11